# Patient Record
Sex: MALE | Race: WHITE | NOT HISPANIC OR LATINO | URBAN - METROPOLITAN AREA
[De-identification: names, ages, dates, MRNs, and addresses within clinical notes are randomized per-mention and may not be internally consistent; named-entity substitution may affect disease eponyms.]

---

## 2018-05-01 ENCOUNTER — APPOINTMENT (RX ONLY)
Dept: URBAN - METROPOLITAN AREA CLINIC 154 | Facility: CLINIC | Age: 83
Setting detail: DERMATOLOGY
End: 2018-05-01

## 2018-05-01 DIAGNOSIS — L81.4 OTHER MELANIN HYPERPIGMENTATION: ICD-10-CM

## 2018-05-01 DIAGNOSIS — L0390 CELLULITIS AND ABSCESS OF UNSPECIFIED SITES: ICD-10-CM

## 2018-05-01 DIAGNOSIS — L70.8 OTHER ACNE: ICD-10-CM

## 2018-05-01 DIAGNOSIS — L0391 CELLULITIS AND ABSCESS OF UNSPECIFIED SITES: ICD-10-CM

## 2018-05-01 PROBLEM — M12.9 ARTHROPATHY, UNSPECIFIED: Status: ACTIVE | Noted: 2018-05-01

## 2018-05-01 PROBLEM — L02.11 CUTANEOUS ABSCESS OF NECK: Status: ACTIVE | Noted: 2018-05-01

## 2018-05-01 PROCEDURE — ? COUNSELING

## 2018-05-01 PROCEDURE — ? ORDER TESTS

## 2018-05-01 PROCEDURE — ? TREATMENT REGIMEN

## 2018-05-01 PROCEDURE — ? INTRALESIONAL KENALOG

## 2018-05-01 PROCEDURE — 11900 INJECT SKIN LESIONS </W 7: CPT

## 2018-05-01 PROCEDURE — ? PRESCRIPTION

## 2018-05-01 PROCEDURE — 99201: CPT | Mod: 25

## 2018-05-01 RX ORDER — ADHESIVE BANDAGE 4" X 5"
BANDAGE TOPICAL
Qty: 1 | Refills: 1 | Status: ERX | COMMUNITY
Start: 2018-05-01

## 2018-05-01 RX ORDER — MUPIROCIN 20 MG/G
OINTMENT TOPICAL
Qty: 1 | Refills: 1 | Status: ERX | COMMUNITY
Start: 2018-05-01

## 2018-05-01 RX ADMIN — Medication: at 20:06

## 2018-05-01 RX ADMIN — MUPIROCIN: 20 OINTMENT TOPICAL at 20:04

## 2018-05-01 ASSESSMENT — LOCATION DETAILED DESCRIPTION DERM
LOCATION DETAILED: RIGHT INFERIOR UPPER BACK
LOCATION DETAILED: LEFT MEDIAL TRAPEZIAL NECK
LOCATION DETAILED: RIGHT LATERAL TRAPEZIAL NECK
LOCATION DETAILED: RIGHT SUPERIOR MEDIAL UPPER BACK
LOCATION DETAILED: LEFT POSTERIOR SHOULDER

## 2018-05-01 ASSESSMENT — LOCATION SIMPLE DESCRIPTION DERM
LOCATION SIMPLE: POSTERIOR NECK
LOCATION SIMPLE: RIGHT UPPER BACK
LOCATION SIMPLE: LEFT SHOULDER

## 2018-05-01 ASSESSMENT — LOCATION ZONE DERM
LOCATION ZONE: ARM
LOCATION ZONE: NECK
LOCATION ZONE: TRUNK

## 2018-05-01 NOTE — PROCEDURE: TREATMENT REGIMEN
Initiate Treatment: Topical mupirocin ointment as directed
Detail Level: Zone
Plan: Patient to apply warm moist compresses for 20minutes 3-4 times daily with epsom salt. Will contact patient when results of culture available for review and address status of lesion at that time and if po antibiotics are needed. Follow up with patient in 2 weeks, will then further discuss if need for excision

## 2018-05-01 NOTE — HPI: SKIN LESION
How Severe Is Your Skin Lesion?: mild
Has Your Skin Lesion Been Treated?: not been treated
Is This A New Presentation, Or A Follow-Up?: Skin Lesion
Additional History: Patient went to emergency room for lesion on back, was given 2 prescriptions for something he is unsure of and has not filled.  Th ED incised and drained the lesion. Currently lesion is minimally purulent, edematous and erythematous.

## 2018-05-09 ENCOUNTER — RX ONLY (OUTPATIENT)
Age: 83
Setting detail: RX ONLY
End: 2018-05-09

## 2018-05-09 RX ORDER — ERYTHROMYCIN 500 MG/1
TABLET, FILM COATED ORAL
Qty: 14 | Refills: 0 | Status: ERX | COMMUNITY
Start: 2018-05-09

## 2018-05-15 ENCOUNTER — APPOINTMENT (RX ONLY)
Dept: URBAN - METROPOLITAN AREA CLINIC 154 | Facility: CLINIC | Age: 83
Setting detail: DERMATOLOGY
End: 2018-05-15

## 2018-05-15 DIAGNOSIS — L0391 CELLULITIS AND ABSCESS OF UNSPECIFIED SITES: ICD-10-CM

## 2018-05-15 DIAGNOSIS — L0390 CELLULITIS AND ABSCESS OF UNSPECIFIED SITES: ICD-10-CM

## 2018-05-15 PROBLEM — L02.11 CUTANEOUS ABSCESS OF NECK: Status: ACTIVE | Noted: 2018-05-15

## 2018-05-15 PROCEDURE — ? COUNSELING

## 2018-05-15 PROCEDURE — ? ADDITIONAL NOTES

## 2018-05-15 PROCEDURE — 99212 OFFICE O/P EST SF 10 MIN: CPT

## 2018-05-15 ASSESSMENT — LOCATION SIMPLE DESCRIPTION DERM: LOCATION SIMPLE: POSTERIOR NECK

## 2018-05-15 ASSESSMENT — LOCATION ZONE DERM: LOCATION ZONE: NECK

## 2018-05-15 ASSESSMENT — LOCATION DETAILED DESCRIPTION DERM: LOCATION DETAILED: RIGHT MEDIAL TRAPEZIAL NECK

## 2018-05-15 NOTE — PROCEDURE: ADDITIONAL NOTES
Additional Notes: Cleaned site with hibiclens, bilateral pressure and minimal purulent blood tinged material expressed. Much less inflammation and erythema than last visit.  Vaseline and adhesive telfa dressing applied. Patient has wife at home who works daily, and patient has difficulty reaching this area to soak it and change bandage. Therefore not advised to have an excision as it is questionable how he would care for the area routinely.  He has trouble remembering verbal instructions. Wrote down specifically for warm water and epsom salt soaks 3 times a day, apply mupirocin ointment twice daily with new bandage, will follow up in 2 weeks.  Patient refuses antibiotic pills at this time. Overall lesion improving.

## 2018-05-29 ENCOUNTER — APPOINTMENT (RX ONLY)
Dept: URBAN - METROPOLITAN AREA CLINIC 154 | Facility: CLINIC | Age: 83
Setting detail: DERMATOLOGY
End: 2018-05-29

## 2018-05-29 DIAGNOSIS — L82.1 OTHER SEBORRHEIC KERATOSIS: ICD-10-CM

## 2018-05-29 DIAGNOSIS — L70.8 OTHER ACNE: ICD-10-CM

## 2018-05-29 DIAGNOSIS — L0391 CELLULITIS AND ABSCESS OF UNSPECIFIED SITES: ICD-10-CM | Status: RESOLVED

## 2018-05-29 DIAGNOSIS — L0390 CELLULITIS AND ABSCESS OF UNSPECIFIED SITES: ICD-10-CM | Status: RESOLVED

## 2018-05-29 PROBLEM — L02.11 CUTANEOUS ABSCESS OF NECK: Status: ACTIVE | Noted: 2018-05-29

## 2018-05-29 PROCEDURE — ? COUNSELING

## 2018-05-29 PROCEDURE — ? ACNE SURGERY

## 2018-05-29 PROCEDURE — ? ADDITIONAL NOTES

## 2018-05-29 PROCEDURE — 10040 EXTRACTION: CPT

## 2018-05-29 PROCEDURE — 99213 OFFICE O/P EST LOW 20 MIN: CPT | Mod: 25

## 2018-05-29 ASSESSMENT — LOCATION SIMPLE DESCRIPTION DERM
LOCATION SIMPLE: RIGHT UPPER BACK
LOCATION SIMPLE: LEFT UPPER BACK
LOCATION SIMPLE: LEFT FOREHEAD
LOCATION SIMPLE: POSTERIOR NECK

## 2018-05-29 ASSESSMENT — LOCATION DETAILED DESCRIPTION DERM
LOCATION DETAILED: LEFT SUPERIOR UPPER BACK
LOCATION DETAILED: RIGHT SUPERIOR LATERAL UPPER BACK
LOCATION DETAILED: RIGHT MEDIAL TRAPEZIAL NECK
LOCATION DETAILED: LEFT MEDIAL TRAPEZIAL NECK
LOCATION DETAILED: RIGHT INFERIOR LATERAL UPPER BACK
LOCATION DETAILED: LEFT FOREHEAD
LOCATION DETAILED: RIGHT POSTERIOR NECK

## 2018-05-29 ASSESSMENT — LOCATION ZONE DERM
LOCATION ZONE: TRUNK
LOCATION ZONE: NECK
LOCATION ZONE: FACE

## 2018-05-29 NOTE — PROCEDURE: ACNE SURGERY
Post-Care Instructions: I reviewed with the patient in detail post-care instructions. Patient is to keep the treatment areas dry overnight, and then apply bacitracin twice daily until healed. Patient may apply hydrogen peroxide soaks to remove any crusting.
Detail Level: Simple
Prep Text (Optional): Prior to removal the treatment areas were prepped in the usual fashion.  Sites cleaned with alcohol wipes.
Extraction Method: cotton-tipped applicators and gentle pressure
Render Post-Care Instructions In Note?: yes
Consent was obtained and risks were reviewed including but not limited to scarring, infection, bleeding, scabbing, incomplete removal, and allergy to anesthesia.
Acne Type: Comedonal Lesions